# Patient Record
Sex: FEMALE | Race: WHITE | URBAN - METROPOLITAN AREA
[De-identification: names, ages, dates, MRNs, and addresses within clinical notes are randomized per-mention and may not be internally consistent; named-entity substitution may affect disease eponyms.]

---
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
## 2023-06-19 NOTE — TELEPHONE ENCOUNTER
"[facility] sent Rx request for the following:    Trazodone  Sig: Take 1 capsule by mouth daily    \"We have used up the script from 9/7/18 with no refills. Pt is on a 28 day cycle fill. Can we get a new Rx to have on file for the next fill.\"  "
Last OV 09/05/18 with PCP. traZODone (DESYREL) 50 MG tablet approved per [facility] Refill Protocol and last office visit note. Refill authorized for 90 days and 0 refill at this time.     Tiana Rodriguez RN on 9/17/2018 at 9:16 AM    
Show Applicator Variable?: Yes
Aperture Size (Optional): C
Duration Of Freeze Thaw-Cycle (Seconds): 3
Render Note In Bullet Format When Appropriate: No
Detail Level: Detailed
Number Of Freeze-Thaw Cycles: 1 freeze-thaw cycle
Post-Care Instructions: I reviewed with the patient in detail post-care instructions. Patient is to wear sunprotection, and avoid picking at any of the treated lesions. Pt may apply Vaseline to crusted or scabbing areas.
Consent: The patient's consent was obtained including but not limited to risks of crusting, scabbing, blistering, scarring, darker or lighter pigmentary change, recurrence, incomplete removal and infection.
Application Tool (Optional): Cry-AC

## (undated) RX ORDER — MEDROXYPROGESTERONE ACETATE 150 MG/ML
INJECTION, SUSPENSION INTRAMUSCULAR
Status: DISPENSED
Start: 2018-07-17